# Patient Record
Sex: MALE | Race: WHITE | NOT HISPANIC OR LATINO | Employment: UNEMPLOYED | URBAN - NONMETROPOLITAN AREA
[De-identification: names, ages, dates, MRNs, and addresses within clinical notes are randomized per-mention and may not be internally consistent; named-entity substitution may affect disease eponyms.]

---

## 2023-01-12 ENCOUNTER — HOSPITAL ENCOUNTER (EMERGENCY)
Facility: HOSPITAL | Age: 48
Discharge: PSYCHIATRIC OR SUBSTANCE USE FACILITY | End: 2023-01-13
Attending: EMERGENCY MEDICINE | Admitting: EMERGENCY MEDICINE

## 2023-01-12 DIAGNOSIS — F19.10 SUBSTANCE ABUSE: Primary | ICD-10-CM

## 2023-01-12 LAB
ALBUMIN SERPL-MCNC: 3.9 G/DL (ref 3.5–5.2)
ALBUMIN/GLOB SERPL: 1.2 G/DL
ALP SERPL-CCNC: 90 U/L (ref 39–117)
ALT SERPL W P-5'-P-CCNC: 35 U/L (ref 1–41)
AMPHET+METHAMPHET UR QL: NEGATIVE
AMPHETAMINES UR QL: NEGATIVE
ANION GAP SERPL CALCULATED.3IONS-SCNC: 12.3 MMOL/L (ref 5–15)
AST SERPL-CCNC: 16 U/L (ref 1–40)
BARBITURATES UR QL SCN: NEGATIVE
BASOPHILS # BLD AUTO: 0.06 10*3/MM3 (ref 0–0.2)
BASOPHILS NFR BLD AUTO: 0.7 % (ref 0–1.5)
BENZODIAZ UR QL SCN: POSITIVE
BILIRUB SERPL-MCNC: 0.4 MG/DL (ref 0–1.2)
BUN SERPL-MCNC: 14 MG/DL (ref 6–20)
BUN/CREAT SERPL: 10.8 (ref 7–25)
BUPRENORPHINE SERPL-MCNC: NEGATIVE NG/ML
CALCIUM SPEC-SCNC: 9.4 MG/DL (ref 8.6–10.5)
CANNABINOIDS SERPL QL: POSITIVE
CHLORIDE SERPL-SCNC: 100 MMOL/L (ref 98–107)
CO2 SERPL-SCNC: 26.7 MMOL/L (ref 22–29)
COCAINE UR QL: NEGATIVE
CREAT SERPL-MCNC: 1.3 MG/DL (ref 0.76–1.27)
DEPRECATED RDW RBC AUTO: 41.8 FL (ref 37–54)
EGFRCR SERPLBLD CKD-EPI 2021: 69 ML/MIN/1.73
EOSINOPHIL # BLD AUTO: 0.29 10*3/MM3 (ref 0–0.4)
EOSINOPHIL NFR BLD AUTO: 3.3 % (ref 0.3–6.2)
ERYTHROCYTE [DISTWIDTH] IN BLOOD BY AUTOMATED COUNT: 12.9 % (ref 12.3–15.4)
ETHANOL BLD-MCNC: <10 MG/DL (ref 0–10)
ETHANOL UR QL: <0.01 %
FLUAV RNA RESP QL NAA+PROBE: NOT DETECTED
FLUBV RNA RESP QL NAA+PROBE: NOT DETECTED
GLOBULIN UR ELPH-MCNC: 3.2 GM/DL
GLUCOSE SERPL-MCNC: 98 MG/DL (ref 65–99)
HCT VFR BLD AUTO: 43.9 % (ref 37.5–51)
HGB BLD-MCNC: 14.6 G/DL (ref 13–17.7)
IMM GRANULOCYTES # BLD AUTO: 0.01 10*3/MM3 (ref 0–0.05)
IMM GRANULOCYTES NFR BLD AUTO: 0.1 % (ref 0–0.5)
LYMPHOCYTES # BLD AUTO: 3.09 10*3/MM3 (ref 0.7–3.1)
LYMPHOCYTES NFR BLD AUTO: 35.1 % (ref 19.6–45.3)
MAGNESIUM SERPL-MCNC: 1.8 MG/DL (ref 1.6–2.6)
MCH RBC QN AUTO: 29.5 PG (ref 26.6–33)
MCHC RBC AUTO-ENTMCNC: 33.3 G/DL (ref 31.5–35.7)
MCV RBC AUTO: 88.7 FL (ref 79–97)
METHADONE UR QL SCN: NEGATIVE
MONOCYTES # BLD AUTO: 1.04 10*3/MM3 (ref 0.1–0.9)
MONOCYTES NFR BLD AUTO: 11.8 % (ref 5–12)
NEUTROPHILS NFR BLD AUTO: 4.32 10*3/MM3 (ref 1.7–7)
NEUTROPHILS NFR BLD AUTO: 49 % (ref 42.7–76)
NRBC BLD AUTO-RTO: 0 /100 WBC (ref 0–0.2)
OPIATES UR QL: NEGATIVE
OXYCODONE UR QL SCN: POSITIVE
PCP UR QL SCN: NEGATIVE
PLATELET # BLD AUTO: 275 10*3/MM3 (ref 140–450)
PMV BLD AUTO: 8.7 FL (ref 6–12)
POTASSIUM SERPL-SCNC: 3.8 MMOL/L (ref 3.5–5.2)
PROPOXYPH UR QL: NEGATIVE
PROT SERPL-MCNC: 7.1 G/DL (ref 6–8.5)
RBC # BLD AUTO: 4.95 10*6/MM3 (ref 4.14–5.8)
SARS-COV-2 RNA RESP QL NAA+PROBE: NOT DETECTED
SODIUM SERPL-SCNC: 139 MMOL/L (ref 136–145)
TRICYCLICS UR QL SCN: NEGATIVE
WBC NRBC COR # BLD: 8.81 10*3/MM3 (ref 3.4–10.8)

## 2023-01-12 PROCEDURE — C9803 HOPD COVID-19 SPEC COLLECT: HCPCS

## 2023-01-12 PROCEDURE — 80306 DRUG TEST PRSMV INSTRMNT: CPT | Performed by: EMERGENCY MEDICINE

## 2023-01-12 PROCEDURE — 81001 URINALYSIS AUTO W/SCOPE: CPT | Performed by: EMERGENCY MEDICINE

## 2023-01-12 PROCEDURE — 99285 EMERGENCY DEPT VISIT HI MDM: CPT

## 2023-01-12 PROCEDURE — 82077 ASSAY SPEC XCP UR&BREATH IA: CPT | Performed by: EMERGENCY MEDICINE

## 2023-01-12 PROCEDURE — 85025 COMPLETE CBC W/AUTO DIFF WBC: CPT | Performed by: EMERGENCY MEDICINE

## 2023-01-12 PROCEDURE — 83735 ASSAY OF MAGNESIUM: CPT | Performed by: EMERGENCY MEDICINE

## 2023-01-12 PROCEDURE — 80053 COMPREHEN METABOLIC PANEL: CPT | Performed by: EMERGENCY MEDICINE

## 2023-01-12 PROCEDURE — 36415 COLL VENOUS BLD VENIPUNCTURE: CPT

## 2023-01-12 PROCEDURE — 87636 SARSCOV2 & INF A&B AMP PRB: CPT | Performed by: EMERGENCY MEDICINE

## 2023-01-13 ENCOUNTER — HOSPITAL ENCOUNTER (INPATIENT)
Facility: HOSPITAL | Age: 48
LOS: 3 days | Discharge: HOME | DRG: 897 | End: 2023-01-16
Attending: PSYCHIATRY & NEUROLOGY | Admitting: PSYCHIATRY & NEUROLOGY

## 2023-01-13 VITALS
DIASTOLIC BLOOD PRESSURE: 74 MMHG | HEIGHT: 70 IN | HEART RATE: 71 BPM | SYSTOLIC BLOOD PRESSURE: 111 MMHG | RESPIRATION RATE: 18 BRPM | OXYGEN SATURATION: 100 % | BODY MASS INDEX: 22.9 KG/M2 | TEMPERATURE: 97.5 F | WEIGHT: 160 LBS

## 2023-01-13 PROBLEM — F19.10 SUBSTANCE ABUSE: Status: ACTIVE | Noted: 2023-01-13

## 2023-01-13 PROBLEM — F11.20 OPIOID USE DISORDER, SEVERE, DEPENDENCE: Status: ACTIVE | Noted: 2023-01-13

## 2023-01-13 LAB
BACTERIA UR QL AUTO: ABNORMAL /HPF
BILIRUB UR QL STRIP: ABNORMAL
CLARITY UR: CLEAR
COLOR UR: ABNORMAL
GLUCOSE UR STRIP-MCNC: NEGATIVE MG/DL
HGB UR QL STRIP.AUTO: NEGATIVE
HYALINE CASTS UR QL AUTO: ABNORMAL /LPF
KETONES UR QL STRIP: ABNORMAL
LEUKOCYTE ESTERASE UR QL STRIP.AUTO: ABNORMAL
NITRITE UR QL STRIP: NEGATIVE
PH UR STRIP.AUTO: 5.5 [PH] (ref 5–8)
PROT UR QL STRIP: ABNORMAL
QT INTERVAL: 392 MS
QTC INTERVAL: 410 MS
RBC # UR STRIP: ABNORMAL /HPF
REF LAB TEST METHOD: ABNORMAL
SP GR UR STRIP: >1.03 (ref 1–1.03)
SQUAMOUS #/AREA URNS HPF: ABNORMAL /HPF
UROBILINOGEN UR QL STRIP: ABNORMAL
WBC # UR STRIP: ABNORMAL /HPF

## 2023-01-13 PROCEDURE — 93005 ELECTROCARDIOGRAM TRACING: CPT | Performed by: NURSE PRACTITIONER

## 2023-01-13 PROCEDURE — 63710000001 ONDANSETRON ODT 4 MG TABLET DISPERSIBLE: Performed by: NURSE PRACTITIONER

## 2023-01-13 PROCEDURE — 93010 ELECTROCARDIOGRAM REPORT: CPT | Performed by: INTERNAL MEDICINE

## 2023-01-13 PROCEDURE — HZ2ZZZZ DETOXIFICATION SERVICES FOR SUBSTANCE ABUSE TREATMENT: ICD-10-PCS | Performed by: PSYCHIATRY & NEUROLOGY

## 2023-01-13 PROCEDURE — 99223 1ST HOSP IP/OBS HIGH 75: CPT | Performed by: PSYCHIATRY & NEUROLOGY

## 2023-01-13 PROCEDURE — 63710000001 ONDANSETRON PER 8 MG: Performed by: PSYCHIATRY & NEUROLOGY

## 2023-01-13 RX ORDER — ACETAMINOPHEN 325 MG/1
650 TABLET ORAL EVERY 6 HOURS PRN
Status: DISCONTINUED | OUTPATIENT
Start: 2023-01-13 | End: 2023-01-16 | Stop reason: HOSPADM

## 2023-01-13 RX ORDER — OXYCODONE HCL 20 MG/1
20 TABLET, FILM COATED, EXTENDED RELEASE ORAL
Status: CANCELLED | OUTPATIENT
Start: 2023-01-13

## 2023-01-13 RX ORDER — BENZTROPINE MESYLATE 1 MG/1
2 TABLET ORAL ONCE AS NEEDED
Status: DISCONTINUED | OUTPATIENT
Start: 2023-01-13 | End: 2023-01-16 | Stop reason: HOSPADM

## 2023-01-13 RX ORDER — GABAPENTIN 400 MG/1
400 CAPSULE ORAL 3 TIMES DAILY
Status: CANCELLED | OUTPATIENT
Start: 2023-01-13

## 2023-01-13 RX ORDER — CYCLOBENZAPRINE HCL 10 MG
10 TABLET ORAL 3 TIMES DAILY PRN
Status: DISCONTINUED | OUTPATIENT
Start: 2023-01-13 | End: 2023-01-16 | Stop reason: HOSPADM

## 2023-01-13 RX ORDER — FAMOTIDINE 20 MG/1
20 TABLET, FILM COATED ORAL 2 TIMES DAILY PRN
Status: DISCONTINUED | OUTPATIENT
Start: 2023-01-13 | End: 2023-01-16 | Stop reason: HOSPADM

## 2023-01-13 RX ORDER — ECHINACEA PURPUREA EXTRACT 125 MG
2 TABLET ORAL AS NEEDED
Status: DISCONTINUED | OUTPATIENT
Start: 2023-01-13 | End: 2023-01-16 | Stop reason: HOSPADM

## 2023-01-13 RX ORDER — TRAZODONE HYDROCHLORIDE 50 MG/1
50 TABLET ORAL NIGHTLY PRN
Status: DISCONTINUED | OUTPATIENT
Start: 2023-01-13 | End: 2023-01-16 | Stop reason: HOSPADM

## 2023-01-13 RX ORDER — ONDANSETRON 4 MG/1
4 TABLET, FILM COATED ORAL EVERY 6 HOURS PRN
Status: DISCONTINUED | OUTPATIENT
Start: 2023-01-13 | End: 2023-01-16 | Stop reason: HOSPADM

## 2023-01-13 RX ORDER — HYDRALAZINE HYDROCHLORIDE 25 MG/1
25 TABLET, FILM COATED ORAL DAILY PRN
Status: DISCONTINUED | OUTPATIENT
Start: 2023-01-13 | End: 2023-01-16 | Stop reason: HOSPADM

## 2023-01-13 RX ORDER — TIZANIDINE 4 MG/1
4 TABLET ORAL EVERY 8 HOURS PRN
COMMUNITY

## 2023-01-13 RX ORDER — GABAPENTIN 300 MG/1
300 CAPSULE ORAL 3 TIMES DAILY
Status: DISCONTINUED | OUTPATIENT
Start: 2023-01-13 | End: 2023-01-16 | Stop reason: HOSPADM

## 2023-01-13 RX ORDER — OXYMORPHONE HYDROCHLORIDE 10 MG/1
10 TABLET, FILM COATED, EXTENDED RELEASE ORAL EVERY 12 HOURS
COMMUNITY
End: 2023-01-16 | Stop reason: HOSPADM

## 2023-01-13 RX ORDER — CLONIDINE HYDROCHLORIDE 0.1 MG/1
0.1 TABLET ORAL 2 TIMES DAILY PRN
Status: DISCONTINUED | OUTPATIENT
Start: 2023-01-16 | End: 2023-01-16 | Stop reason: HOSPADM

## 2023-01-13 RX ORDER — IBUPROFEN 400 MG/1
400 TABLET ORAL EVERY 6 HOURS PRN
Status: DISCONTINUED | OUTPATIENT
Start: 2023-01-13 | End: 2023-01-16 | Stop reason: HOSPADM

## 2023-01-13 RX ORDER — CLONIDINE HYDROCHLORIDE 0.1 MG/1
0.1 TABLET ORAL ONCE AS NEEDED
Status: DISCONTINUED | OUTPATIENT
Start: 2023-01-17 | End: 2023-01-16 | Stop reason: HOSPADM

## 2023-01-13 RX ORDER — HYDROXYZINE HYDROCHLORIDE 25 MG/1
50 TABLET, FILM COATED ORAL EVERY 6 HOURS PRN
Status: DISCONTINUED | OUTPATIENT
Start: 2023-01-13 | End: 2023-01-16 | Stop reason: HOSPADM

## 2023-01-13 RX ORDER — TIZANIDINE 4 MG/1
4 TABLET ORAL EVERY 8 HOURS PRN
Status: DISCONTINUED | OUTPATIENT
Start: 2023-01-13 | End: 2023-01-16 | Stop reason: HOSPADM

## 2023-01-13 RX ORDER — DICYCLOMINE HYDROCHLORIDE 10 MG/1
20 CAPSULE ORAL ONCE
Status: COMPLETED | OUTPATIENT
Start: 2023-01-13 | End: 2023-01-13

## 2023-01-13 RX ORDER — ALUMINA, MAGNESIA, AND SIMETHICONE 2400; 2400; 240 MG/30ML; MG/30ML; MG/30ML
15 SUSPENSION ORAL EVERY 6 HOURS PRN
Status: DISCONTINUED | OUTPATIENT
Start: 2023-01-13 | End: 2023-01-16 | Stop reason: HOSPADM

## 2023-01-13 RX ORDER — LISINOPRIL AND HYDROCHLOROTHIAZIDE 25; 20 MG/1; MG/1
1 TABLET ORAL DAILY
Status: ON HOLD | COMMUNITY
End: 2023-01-16 | Stop reason: SDUPTHER

## 2023-01-13 RX ORDER — ONDANSETRON 4 MG/1
4 TABLET, ORALLY DISINTEGRATING ORAL ONCE
Status: COMPLETED | OUTPATIENT
Start: 2023-01-13 | End: 2023-01-13

## 2023-01-13 RX ORDER — OXYCODONE HYDROCHLORIDE 10 MG/1
10 TABLET ORAL 3 TIMES DAILY
COMMUNITY
End: 2023-01-16 | Stop reason: HOSPADM

## 2023-01-13 RX ORDER — LISINOPRIL 10 MG/1
20 TABLET ORAL
Status: DISCONTINUED | OUTPATIENT
Start: 2023-01-13 | End: 2023-01-16 | Stop reason: HOSPADM

## 2023-01-13 RX ORDER — LOPERAMIDE HYDROCHLORIDE 2 MG/1
2 CAPSULE ORAL
Status: DISCONTINUED | OUTPATIENT
Start: 2023-01-13 | End: 2023-01-16 | Stop reason: HOSPADM

## 2023-01-13 RX ORDER — BENZTROPINE MESYLATE 1 MG/ML
1 INJECTION INTRAMUSCULAR; INTRAVENOUS ONCE AS NEEDED
Status: DISCONTINUED | OUTPATIENT
Start: 2023-01-13 | End: 2023-01-16 | Stop reason: HOSPADM

## 2023-01-13 RX ORDER — DICYCLOMINE HYDROCHLORIDE 10 MG/1
10 CAPSULE ORAL 3 TIMES DAILY PRN
Status: DISCONTINUED | OUTPATIENT
Start: 2023-01-13 | End: 2023-01-16 | Stop reason: HOSPADM

## 2023-01-13 RX ORDER — OXYCODONE HYDROCHLORIDE 5 MG/1
10 TABLET ORAL 3 TIMES DAILY
Status: CANCELLED | OUTPATIENT
Start: 2023-01-13

## 2023-01-13 RX ORDER — BENZONATATE 100 MG/1
100 CAPSULE ORAL 3 TIMES DAILY PRN
Status: DISCONTINUED | OUTPATIENT
Start: 2023-01-13 | End: 2023-01-16 | Stop reason: HOSPADM

## 2023-01-13 RX ORDER — CLONIDINE HYDROCHLORIDE 0.1 MG/1
0.1 TABLET ORAL 3 TIMES DAILY PRN
Status: DISPENSED | OUTPATIENT
Start: 2023-01-15 | End: 2023-01-16

## 2023-01-13 RX ORDER — CLONIDINE HYDROCHLORIDE 0.1 MG/1
0.1 TABLET ORAL 4 TIMES DAILY PRN
Status: DISPENSED | OUTPATIENT
Start: 2023-01-14 | End: 2023-01-15

## 2023-01-13 RX ORDER — GABAPENTIN 400 MG/1
400 CAPSULE ORAL 3 TIMES DAILY
COMMUNITY
End: 2023-01-16 | Stop reason: HOSPADM

## 2023-01-13 RX ORDER — HYDROCHLOROTHIAZIDE 25 MG/1
25 TABLET ORAL
Status: DISCONTINUED | OUTPATIENT
Start: 2023-01-13 | End: 2023-01-16 | Stop reason: HOSPADM

## 2023-01-13 RX ORDER — CLONIDINE HYDROCHLORIDE 0.1 MG/1
0.1 TABLET ORAL 4 TIMES DAILY PRN
Status: DISPENSED | OUTPATIENT
Start: 2023-01-13 | End: 2023-01-14

## 2023-01-13 RX ADMIN — DICYCLOMINE HYDROCHLORIDE 10 MG: 10 CAPSULE ORAL at 14:19

## 2023-01-13 RX ADMIN — GABAPENTIN 300 MG: 300 CAPSULE ORAL at 15:21

## 2023-01-13 RX ADMIN — IBUPROFEN 400 MG: 400 TABLET, FILM COATED ORAL at 02:15

## 2023-01-13 RX ADMIN — LISINOPRIL 20 MG: 10 TABLET ORAL at 08:37

## 2023-01-13 RX ADMIN — GABAPENTIN 300 MG: 300 CAPSULE ORAL at 08:38

## 2023-01-13 RX ADMIN — DICYCLOMINE HYDROCHLORIDE 10 MG: 10 CAPSULE ORAL at 21:14

## 2023-01-13 RX ADMIN — HYDROCHLOROTHIAZIDE 25 MG: 25 TABLET ORAL at 08:37

## 2023-01-13 RX ADMIN — IBUPROFEN 400 MG: 400 TABLET, FILM COATED ORAL at 08:39

## 2023-01-13 RX ADMIN — CLONIDINE HYDROCHLORIDE 0.1 MG: 0.1 TABLET ORAL at 08:37

## 2023-01-13 RX ADMIN — HYDROXYZINE HYDROCHLORIDE 50 MG: 25 TABLET ORAL at 21:14

## 2023-01-13 RX ADMIN — DICYCLOMINE HYDROCHLORIDE 20 MG: 10 CAPSULE ORAL at 00:13

## 2023-01-13 RX ADMIN — ONDANSETRON HYDROCHLORIDE 4 MG: 4 TABLET, FILM COATED ORAL at 14:19

## 2023-01-13 RX ADMIN — ACETAMINOPHEN 650 MG: 325 TABLET ORAL at 05:56

## 2023-01-13 RX ADMIN — CLONIDINE HYDROCHLORIDE 0.1 MG: 0.1 TABLET ORAL at 21:14

## 2023-01-13 RX ADMIN — HYDROXYZINE HYDROCHLORIDE 50 MG: 25 TABLET ORAL at 14:20

## 2023-01-13 RX ADMIN — IBUPROFEN 400 MG: 400 TABLET, FILM COATED ORAL at 14:19

## 2023-01-13 RX ADMIN — CYCLOBENZAPRINE 10 MG: 10 TABLET, FILM COATED ORAL at 02:16

## 2023-01-13 RX ADMIN — HYDROXYZINE HYDROCHLORIDE 50 MG: 25 TABLET ORAL at 02:15

## 2023-01-13 RX ADMIN — TRAZODONE HYDROCHLORIDE 50 MG: 50 TABLET ORAL at 21:14

## 2023-01-13 RX ADMIN — TRAZODONE HYDROCHLORIDE 50 MG: 50 TABLET ORAL at 02:15

## 2023-01-13 RX ADMIN — IBUPROFEN 400 MG: 400 TABLET, FILM COATED ORAL at 21:15

## 2023-01-13 RX ADMIN — GABAPENTIN 300 MG: 300 CAPSULE ORAL at 21:14

## 2023-01-13 RX ADMIN — CLONIDINE HYDROCHLORIDE 0.1 MG: 0.1 TABLET ORAL at 14:19

## 2023-01-13 RX ADMIN — CYCLOBENZAPRINE 10 MG: 10 TABLET, FILM COATED ORAL at 14:20

## 2023-01-13 RX ADMIN — ONDANSETRON 4 MG: 4 TABLET, ORALLY DISINTEGRATING ORAL at 00:13

## 2023-01-13 NOTE — NURSING NOTE
"46 y/o presents to intake with request for help to detox from \"prescribed Oxymorphone & Oxycodone\". Pt advised he takes Oxycodone 10mg tid with last dose 1-11-23, Oxymorphone 10mg bid with last dose 1-11-23. Pt advised he started taking prescribed meds around 2009 for neck injury/pain. Advised he took a hit off THC a couple of days ago, but does not use regularly. Advised he took a xanax from old prescription yesterday to help ease withdrawal symptoms. Pt reported he will be going to a rehab facility in [facility] following detox.     Denies SI/HI/AVH.    Depression 7/10.  Anxiety 6/10.    COWS 4  CIWA 0    Denies alcohol use, or illicit drug use. States he did take some leftover Xanax yesterday in attempts to help symptoms.     Creatine 1.30    U/A -  Dark Yellow  Specific Gravity greater than 1.030  Ketones 15  Leukocytes trace  Protein 100  Bilirubin small  WBC 3-5  Bacteria trace    UDS  Benzo  Oxycodone  THC    Medicated with Bentyl and Zofran in intake for stomach cramps.   "
"Pt awaiting EKG for admission - spoke with tech \"order received, busy with other patients at present time.\" Explained delay to pt.  "
Awaiting registration for armband.  
Cleared for admit per SKYE Aguilar.  
Contacted lead, Qing, for bed assignment - 42b.  
EKG being done.  
Pt to intake. Search completed with 2 staff members present. Pt educated about mask and encouraged to keep mask on in intake area. Items placed in cabinet.  
Received to intake with ER tech.  
Spoke to Dr.B. Cabrera via phone. Intake information provided. Instructed to admit the patient. Admit orders received. Clonidine detox orders RBTOx2. Patient and ED provide, SKYE Aguilar made aware of plan of care. Safety precautions maintained.  
Urine collected.  Pt request something for stomach cramps, anxiety, chronic pain left arm, shoulder, neck, left hip, leg. Notified SKYE Aguilar.  
Waiting for admission EKG per AMERICA Fitzgerald, APRN request.  
No

## 2023-01-13 NOTE — H&P
INITIAL PSYCHIATRIC HISTORY & PHYSICAL    Patient Identification:  Name:  Indra Rodriguez  Age:  47 y.o.  Sex:  male  :  1975  MRN:  [id number]   Visit Number:  [id number]  Primary Care Physician:  Provider, No Known    SUBJECTIVE    CC/Focus of Exam: Opioid use     HPI: Indra Rodriguez is a 47 y.o. male who was admitted on 2023 with complaints of opioid use and withdrawals. The patient reports a long history of substance use. First use was  when he was prescribed opioids for neck injury and pain. Over time the use increased and the patient  continued to use despite negative consequences and constantly needs more pain medication. The patient endorses symptoms of tolerance and withdrawals. Has tried to cut down and stop but has not been successful. Spends too much time and resources in pursuit of substance use. Longest period of sobriety is reported to be none as he has been on continuous prescription.  Currently using Oxycodone 10mg tid with last dose 23, Oxymorphone 10mg bid with last dose 23.  The patient reports he took a hit off THC a couple of days ago but denies regular use. Also took an old prescription Xanax day before yesterday to help with withdrawal symptoms.  Withdrawal symptoms include cramps, feeling shaky, diarrhea, feeling weak    PAST PSYCHIATRIC HX: The patient reports a history of anxiety and was prescribed Xanax for it for 9 years and he stopped taking it in 2019.     SUBSTANCE USE HX: See HPI for opioid use.     SOCIAL HX:   Social History     Socioeconomic History   • Marital status:      Spouse name: Jana Rodriguez   • Number of children: 3   • Years of education: college   • Highest education level: Associate degree: academic program   Tobacco Use   • Smoking status: Never   Vaping Use   • Vaping Use: Never used   Substance and Sexual Activity   • Alcohol use: Never   • Drug use: Yes     Types: Benzodiazepines, Hydromorphone, Oxycodone, Marijuana   •  Sexual activity: Yes     Partners: Female         Past Medical History:   Diagnosis Date   • Anxiety    • Asthma    • Depression    • HTN (hypertension)    • Kidney stones    • Neck injuries    • YENI (obstructive sleep apnea)    • Substance abuse (HCC)           Past Surgical History:   Procedure Laterality Date   • CERVICAL SPINE SURGERY     • CHOLECYSTECTOMY     • CYSTOSCOPY W/ LASER LITHOTRIPSY     • KNEE ARTHROSCOPY Right        Family History   Problem Relation Age of Onset   • Anxiety disorder Mother          Medications Prior to Admission   Medication Sig Dispense Refill Last Dose   • gabapentin (NEURONTIN) 400 MG capsule Take 400 mg by mouth 3 (Three) Times a Day.      • lisinopril-hydrochlorothiazide (PRINZIDE,ZESTORETIC) 20-25 MG per tablet Take 1 tablet by mouth Daily.      • oxyCODONE (ROXICODONE) 10 MG tablet Take 10 mg by mouth 3 (Three) Times a Day.      • oxyMORphone ER (OPANA ER) 10 MG 12 hr tablet Take 10 mg by mouth Every 12 (Twelve) Hours.      • tiZANidine (ZANAFLEX) 4 MG tablet Take 4 mg by mouth Every 8 (Eight) Hours As Needed for Muscle Spasms.            ALLERGIES:  Patient has no known allergies.    Temp:  [96.7 °F (35.9 °C)-97.9 °F (36.6 °C)] 96.7 °F (35.9 °C)  Heart Rate:  [] 86  Resp:  [18-19] 18  BP: ()/() 148/74    REVIEW OF SYSTEMS:  Review of Systems   Constitutional: Positive for chills, diaphoresis and fatigue.   HENT: Negative.    Eyes: Negative.    Respiratory: Negative.    Cardiovascular: Negative.    Gastrointestinal: Positive for abdominal pain, diarrhea and nausea.   Endocrine: Negative.    Genitourinary: Negative.    Musculoskeletal: Positive for back pain and neck pain.   Skin: Negative.    Allergic/Immunologic: Negative.    Neurological: Positive for weakness.   Hematological: Negative.    Psychiatric/Behavioral: Positive for dysphoric mood. The patient is nervous/anxious.         OBJECTIVE    PHYSICAL EXAM:  Physical Exam  Constitutional:  Appears  well-developed and well-nourished.   HENT:   Head: Normocephalic and atraumatic.   Right Ear: External ear normal.   Left Ear: External ear normal.   Mouth/Throat: Oropharynx is clear and moist.   Eyes: Pupils are equal, round, and reactive to light. Conjunctivae and EOM are normal.   Neck: Normal range of motion. Neck supple.   Cardiovascular: Normal rate, regular rhythm and normal heart sounds.    Respiratory: Effort normal and breath sounds normal. No respiratory distress. No wheezes.   GI: Soft. Bowel sounds are normal.No distension. There is no tenderness.   Musculoskeletal: Normal range of motion. No edema or deformity.   Neurological:No cranial nerve deficit. Coordination normal.   Skin: Skin is warm and dry. No rash noted. No erythema.       MENTAL STATUS EXAM:   Hygiene:   fair  Cooperation:  Cooperative  Eye Contact:  Fair  Psychomotor Behavior:  Appropriate  Affect:  Restricted  Hopelessness: Denies  Speech:  Normal  Goal directed  Thought Content:  Normal  Suicidal:  None  Homicidal:  None  Hallucinations:  None  Delusion:  None  Memory:  Intact  Orientation:  Person, Place, Time and Situation  Reliability:  fair  Insight:  Fair  Judgement:  Fair  Impulse Control:  Fair      Imaging Results (Last 24 Hours)     ** No results found for the last 24 hours. **           ECG/EMG Results (most recent)     None           Lab Results   Component Value Date    GLUCOSE 98 01/12/2023    BUN 14 01/12/2023    CREATININE 1.30 (H) 01/12/2023    BCR 10.8 01/12/2023    CO2 26.7 01/12/2023    CALCIUM 9.4 01/12/2023    ALBUMIN 3.9 01/12/2023    AST 16 01/12/2023    ALT 35 01/12/2023       Lab Results   Component Value Date    WBC 8.81 01/12/2023    HGB 14.6 01/12/2023    HCT 43.9 01/12/2023    MCV 88.7 01/12/2023     01/12/2023       Last Urine Toxicity     LAST URINE TOXICITY RESULTS Latest Ref Rng & Units 1/12/2023    AMPHETAMINES SCREEN, URINE Negative Negative    BARBITURATES SCREEN Negative Negative     BENZODIAZEPINE SCREEN, URINE Negative Positive(A)    BUPRENORPHINEUR Negative Negative    COCAINE SCREEN, URINE Negative Negative    METHADONE SCREEN, URINE Negative Negative    METHAMPHETAMINEUR Negative Negative          Brief Urine Lab Results  (Last result in the past 365 days)      Color   Clarity   Blood   Leuk Est   Nitrite   Protein   CREAT   Urine HCG        01/12/23 2334 Dark Yellow   Clear   Negative   Trace   Negative   100 mg/dL (2+)                 DATA  Labs reviewed. Creatinine 1.3. UA shows dark yellow color, ketones, trace leukocyte esterase, protein, bilirubin, 3-5 WBC, trace bacteria. UDS positive for benzodiazepine, oxycodone, THC.   EKG reviewed. QTc 410 ms. JACKSON reviewed.   Record reviewed. No previous treatment noted in this hospital for mental health or substance use problems.       Strengths: Motivated for treatment    Weaknesses:Substance use and Poor coping skills    Code status:  Full  Discussed code status with patient.    ASSESSMENT & PLAN:        Opioid use disorder, severe, dependence (HCC)  -Clonidine detox  -Symptom control medication      HTN (hypertension)  -Lisinopril-HCTZ      Chronic pain  -Continue gabapentin      Elevated Creatinine  -Repeat in am  -Encourage po intake  -May have to hold Lisinopril-HCTZ      Anxiety disorder unspecified  -Start duloxetine 30 mg daily    The patient has been admitted for safety and stabilization.  Patient will be monitored for suicidality daily and maintained on Special Precautions Level 4 (q30 min checks).  The patient will have individual and group therapy with a master's level therapist. A master treatment plan will be developed and agreed upon by the patient and his/her treatment team.  The patient's estimated length of stay in the hospital is 5-7 days.

## 2023-01-13 NOTE — PLAN OF CARE
Goal Outcome Evaluation:              Problem: Adult Behavioral Health Plan of Care  Goal: Patient-Specific Goal (Individualization)  Outcome: Ongoing, Progressing  Flowsheets  Taken 1/13/2023 1055  Patient-Specific Goals (Include Timeframe): Identify 2-3 coping skills, address relapse prevention methods, complete aftercare plans, and deny SI/HI prior to discharge.  Individualized Care Needs: Therapist to offer 1-4 therapy sessions, aftercare planning, safety planning, family education, group therapy, and brief CBT/MI interventions.  Anxieties, Fears or Concerns: patient reports being uncomfortable and scared due to withdrawals  Taken 1/13/2023 1049  Patient Personal Strengths:   resilient   resourceful  Patient Vulnerabilities:   substance abuse/addiction   poor impulse control  Goal: Optimized Coping Skills in Response to Life Stressors  Outcome: Ongoing, Progressing  Flowsheets (Taken 1/13/2023 1055)  Optimized Coping Skills in Response to Life Stressors: making progress toward outcome  Intervention: Promote Effective Coping Strategies  Flowsheets (Taken 1/13/2023 1055)  Supportive Measures:   active listening utilized   counseling provided   decision-making supported   verbalization of feelings encouraged   goal-setting facilitated  Goal: Develops/Participates in Therapeutic [place] to Support Successful Transition  Outcome: Ongoing, Progressing  Flowsheets (Taken 1/13/2023 1055)  Develops/Participates in Therapeutic [place] to Support Successful Transition: making progress toward outcome  Intervention: Foster Therapeutic [place]  Flowsheets (Taken 1/13/2023 1055)  Trust Relationship/Rapport:   care explained   reassurance provided   choices provided   thoughts/feelings acknowledged   emotional support provided   empathic listening provided   questions answered   questions encouraged  Intervention: Mutually Develop Transition Plan  Flowsheets  Taken 1/13/2023 1055  Outpatient/Agency/Support Group Needs:  residential services  Transition Support:   follow-up care discussed   follow-up care coordinated   crisis management plan verbalized   crisis management plan promoted   community resources reviewed  Anticipated Discharge Disposition: residential substance use unit  Taken 1/13/2023 1053  Discharge Coordination/Progress: Therapist attempted to meet with patient to complete assessment. Patient gave verbal consent for [facility] for [facility] inpatient rehab placement. Agency to provide transportation, pending acceptance.  Transportation Anticipated: agency  Transportation Concerns: none  Current Discharge Risk:   substance use/abuse   psychiatric illness  Concerns to be Addressed:   coping/stress   mental health   substance/tobacco abuse/use   medication  Readmission Within the Last 30 Days: no previous admission in last 30 days  Patient/Family Anticipated Services at Transition: rehabilitation services  Patient's Choice of Community Agency(s): [facility]  Patient/Family Anticipates Transition to: inpatient rehabilitation facility  Offered/Gave Vendor List: no      DATA:         Therapist met individually with patient this date to introduce role and to discuss hospitalization expectations. Patient agreeable.     Verbal consent for [facility]     Clinical Maneuvering/Intervention:     Therapist assisted patient in processing above session content; acknowledged and normalized patient’s thoughts, feelings, and concerns.  Discussed the therapist/patient relationship and explain the parameters and limitations of relative confidentiality.  Also discussed the importance of active participation, and honesty to the treatment process.  Encouraged the patient to discuss/vent their feelings, frustrations, and fears concerning their ongoing medical issues and validated their feelings.     Discussed the importance of finding enjoyable activities and coping skills that the patient can engage in a regular basis. Discussed  healthy coping skills such as distraction, self love, grounding, thought challenges/reframing, etc.  Provided patient with list of healthy coping skills this date. Discussed the importance of medication compliance.  Praised the patient for seeking help and spent the majority of the session building rapport.       Allowed patient to freely discuss issues without interruption or judgment. Provided safe, confidential environment to facilitate the development of positive therapeutic relationship and encourage open, honest communication.      Therapist addressed discharge safety planning this date. Assisted patient in identifying risk factors which would indicate the need for higher level of care after discharge;  including thoughts to harm self or others and/or self-harming behavior. Encouraged patient to call 911, or present to the nearest emergency room should any of these events occur. Discussed crisis intervention services and means to access.  Encouraged securing any objects of harm.       Therapist completed integrated summary, treatment plan, and initiated social history this date.  Therapist is strongly encouraging family involvement in treatment.       ASSESSMENT:      The patient is a 46 y/o  male admitted for opiate detox treatment. Therapist attempted to meet with patient to complete assessment. Patient was snoring loudly but would still respond to questions at times, abrupt and exaggerated movements. Patient gave verbal consent for [facility] in [place] for [facility] inpatient rehab placement. Patient has no history of past psych/detox admissions. Patient has been using opiates since 2009 after a work related injury and has most recently been involved with [facility] in [place], [place]. Rehab agency to provide transportation at discharge pending acceptance of patient.      PLAN:       Patient to remain hospitalized this date.     Treatment team will focus efforts on stabilizing  patient's acute symptoms while providing education on healthy coping and crisis management to reduce hospitalizations.   Patient requires daily psychiatrist evaluation and 24/7 nursing supervision to promote patient  safety.     Therapist will offer 1-4 individual sessions, 1 therapy group daily, family education, and appropriate referral.    Therapist recommends [facility] inpatient rehab. Patient gave verbal consent for [facility].

## 2023-01-13 NOTE — PROGRESS NOTES
1600: Spoke with [facility]. They will accept patient. Treatment team to call Monday and further coordinate.     1405: Transferred call so patient could complete phone screening.     1201: Navigator is helping Primary Therapist with discharge planning for patient. Navigator contacted [facility] and spoke with Danny. Patient will need to call today after 2pm to complete a phone screening.     [facility] - [phone]

## 2023-01-13 NOTE — PLAN OF CARE
Problem: Adult Behavioral Health Plan of Care  Goal: Plan of Care Review  Outcome: Ongoing, Progressing  Flowsheets  Taken 1/13/2023 1435  Progress: improving  Plan of Care Reviewed With: patient  Patient Agreement with Plan of Care: agrees  Taken 1/13/2023 0836  Plan of Care Reviewed With: patient  Patient Agreement with Plan of Care: agrees   Goal Outcome Evaluation:  Plan of Care Reviewed With: patient  Patient Agreement with Plan of Care: agrees     Progress: improving

## 2023-01-13 NOTE — PLAN OF CARE
Goal Outcome Evaluation:  Plan of Care Reviewed With: patient  Patient Agreement with Plan of Care: agrees         Pt new admit for this shift. Given Flexerail, Motrin, and Atarax prn medications.

## 2023-01-14 LAB
ANION GAP SERPL CALCULATED.3IONS-SCNC: 6.3 MMOL/L (ref 5–15)
BUN SERPL-MCNC: 14 MG/DL (ref 6–20)
BUN/CREAT SERPL: 16.3 (ref 7–25)
CALCIUM SPEC-SCNC: 9.4 MG/DL (ref 8.6–10.5)
CHLORIDE SERPL-SCNC: 103 MMOL/L (ref 98–107)
CO2 SERPL-SCNC: 29.7 MMOL/L (ref 22–29)
CREAT SERPL-MCNC: 0.86 MG/DL (ref 0.76–1.27)
EGFRCR SERPLBLD CKD-EPI 2021: 108.8 ML/MIN/1.73
GLUCOSE SERPL-MCNC: 136 MG/DL (ref 65–99)
POTASSIUM SERPL-SCNC: 4.1 MMOL/L (ref 3.5–5.2)
SODIUM SERPL-SCNC: 139 MMOL/L (ref 136–145)

## 2023-01-14 PROCEDURE — 80048 BASIC METABOLIC PNL TOTAL CA: CPT | Performed by: PSYCHIATRY & NEUROLOGY

## 2023-01-14 PROCEDURE — 99232 SBSQ HOSP IP/OBS MODERATE 35: CPT | Performed by: PSYCHIATRY & NEUROLOGY

## 2023-01-14 RX ADMIN — IBUPROFEN 400 MG: 400 TABLET, FILM COATED ORAL at 14:07

## 2023-01-14 RX ADMIN — GABAPENTIN 300 MG: 300 CAPSULE ORAL at 08:21

## 2023-01-14 RX ADMIN — TRAZODONE HYDROCHLORIDE 50 MG: 50 TABLET ORAL at 21:08

## 2023-01-14 RX ADMIN — HYDROXYZINE HYDROCHLORIDE 50 MG: 25 TABLET ORAL at 14:06

## 2023-01-14 RX ADMIN — IBUPROFEN 400 MG: 400 TABLET, FILM COATED ORAL at 08:21

## 2023-01-14 RX ADMIN — HYDROXYZINE HYDROCHLORIDE 50 MG: 25 TABLET ORAL at 08:21

## 2023-01-14 RX ADMIN — HYDROXYZINE HYDROCHLORIDE 50 MG: 25 TABLET ORAL at 21:08

## 2023-01-14 RX ADMIN — CLONIDINE HYDROCHLORIDE 0.1 MG: 0.1 TABLET ORAL at 14:06

## 2023-01-14 RX ADMIN — GABAPENTIN 300 MG: 300 CAPSULE ORAL at 21:08

## 2023-01-14 RX ADMIN — HYDROCHLOROTHIAZIDE 25 MG: 25 TABLET ORAL at 08:21

## 2023-01-14 RX ADMIN — LISINOPRIL 20 MG: 10 TABLET ORAL at 08:21

## 2023-01-14 RX ADMIN — IBUPROFEN 400 MG: 400 TABLET, FILM COATED ORAL at 21:08

## 2023-01-14 RX ADMIN — CYCLOBENZAPRINE 10 MG: 10 TABLET, FILM COATED ORAL at 21:08

## 2023-01-14 RX ADMIN — GABAPENTIN 300 MG: 300 CAPSULE ORAL at 15:21

## 2023-01-14 RX ADMIN — CYCLOBENZAPRINE 10 MG: 10 TABLET, FILM COATED ORAL at 08:21

## 2023-01-14 NOTE — PLAN OF CARE
Goal Outcome Evaluation:  Plan of Care Reviewed With: patient  Patient Agreement with Plan of Care: agrees     Progress: no change  Outcome Evaluation: Patient new admission from yesterday. Stated that his appetite is fair. Reported sleeping good. Rated anxiety and depression a 7. Rated his cravings at 3 with withdrawal symptoms. Patient is calm/cooperative. He stayed in his room for most of the shift. Encouraged him to come into day room to socialize with the other patients. Will observe for changes and will folllow up as needed.

## 2023-01-14 NOTE — PLAN OF CARE
Goal Outcome Evaluation:  Plan of Care Reviewed With: patient  Patient Agreement with Plan of Care: agrees     Progress: improving       Pt slept well, appetite is good, and participated in group. Pt given Bentyl, Atarax, Motrin and Trazodone prn medications.

## 2023-01-15 PROCEDURE — 99232 SBSQ HOSP IP/OBS MODERATE 35: CPT | Performed by: PSYCHIATRY & NEUROLOGY

## 2023-01-15 RX ADMIN — GABAPENTIN 300 MG: 300 CAPSULE ORAL at 08:02

## 2023-01-15 RX ADMIN — IBUPROFEN 400 MG: 400 TABLET, FILM COATED ORAL at 21:07

## 2023-01-15 RX ADMIN — HYDROCHLOROTHIAZIDE 25 MG: 25 TABLET ORAL at 08:00

## 2023-01-15 RX ADMIN — CYCLOBENZAPRINE 10 MG: 10 TABLET, FILM COATED ORAL at 08:22

## 2023-01-15 RX ADMIN — CYCLOBENZAPRINE 10 MG: 10 TABLET, FILM COATED ORAL at 21:07

## 2023-01-15 RX ADMIN — GABAPENTIN 300 MG: 300 CAPSULE ORAL at 21:07

## 2023-01-15 RX ADMIN — LOPERAMIDE HYDROCHLORIDE 2 MG: 2 CAPSULE ORAL at 08:32

## 2023-01-15 RX ADMIN — DICYCLOMINE HYDROCHLORIDE 10 MG: 10 CAPSULE ORAL at 19:57

## 2023-01-15 RX ADMIN — LOPERAMIDE HYDROCHLORIDE 2 MG: 2 CAPSULE ORAL at 15:35

## 2023-01-15 RX ADMIN — HYDROXYZINE HYDROCHLORIDE 50 MG: 25 TABLET ORAL at 21:07

## 2023-01-15 RX ADMIN — ACETAMINOPHEN 650 MG: 325 TABLET ORAL at 19:57

## 2023-01-15 RX ADMIN — GABAPENTIN 300 MG: 300 CAPSULE ORAL at 15:35

## 2023-01-15 RX ADMIN — LISINOPRIL 20 MG: 10 TABLET ORAL at 08:00

## 2023-01-15 RX ADMIN — DICYCLOMINE HYDROCHLORIDE 10 MG: 10 CAPSULE ORAL at 08:32

## 2023-01-15 RX ADMIN — HYDROXYZINE HYDROCHLORIDE 50 MG: 25 TABLET ORAL at 15:38

## 2023-01-15 RX ADMIN — TRAZODONE HYDROCHLORIDE 50 MG: 50 TABLET ORAL at 21:07

## 2023-01-15 RX ADMIN — IBUPROFEN 400 MG: 400 TABLET, FILM COATED ORAL at 06:32

## 2023-01-15 RX ADMIN — IBUPROFEN 400 MG: 400 TABLET, FILM COATED ORAL at 15:35

## 2023-01-15 RX ADMIN — CLONIDINE HYDROCHLORIDE 0.1 MG: 0.1 TABLET ORAL at 21:07

## 2023-01-15 RX ADMIN — HYDROXYZINE HYDROCHLORIDE 50 MG: 25 TABLET ORAL at 08:23

## 2023-01-15 NOTE — PLAN OF CARE
Goal Outcome Evaluation:  Plan of Care Reviewed With: patient  Patient Agreement with Plan of Care: agrees     Progress: improving  Outcome Evaluation: Pt rates anxiety and depression 7/10, pt rates craving 5/10 and denies SI/HI/AVH. Pt continues to have somatic concerns but spent more time in the day room interacting with his peers. Pt appeared to sleep throughout the night.

## 2023-01-15 NOTE — PLAN OF CARE
Goal Outcome Evaluation:  Plan of Care Reviewed With: patient  Patient Agreement with Plan of Care: agrees     Progress: improving  Outcome Evaluation: Patient reports cravings have decreased. Reports withdrawal sx as abdominal discomfort, headache and diarrhea. Inreracting well with others. No other signs distress noted.

## 2023-01-15 NOTE — PROGRESS NOTES
"INPATIENT PSYCHIATRIC PROGRESS NOTE    Name:  Indra Rodriguez  :  1975  MRN:  [id number]  Visit Number:  [id number]  Length of stay:  1    SUBJECTIVE    CC/Focus of Exam: opioid use    INTERVAL HISTORY:  The patient reports he is experiencing more pain today. States his last use was two days ago when he took oxymorphone and oxycodone.  Depression rating 10  Anxiety rating 7/10  Sleep:fair  Withdrawal sx: body aches, upset stomach, weakness, tremors, foggy headed  Cravin/10    Review of Systems   Constitutional: Positive for diaphoresis and fatigue.   Musculoskeletal: Positive for arthralgias, back pain and myalgias.   Neurological: Positive for tremors and weakness.   Psychiatric/Behavioral: Positive for dysphoric mood. The patient is nervous/anxious.        OBJECTIVE    Temp:  [96.8 °F (36 °C)-97.9 °F (36.6 °C)] 96.8 °F (36 °C)  Heart Rate:  [] 96  Resp:  [16-18] 18  BP: (118-144)/(72-86) 118/72    MENTAL STATUS EXAM:  Appearance:Casually dressed, good hygeine.   Cooperation:Cooperative  Psychomotor: No psychomotor agitation/retardation, No EPS, No motor tics  Speech-normal rate, amount.  Mood \"anxious\"   Affect-congruent, appropriate, stable  Thought Content-goal directed, no delusional material present  Thought process-linear, organized.  Suicidality: No SI  Homicidality: No HI  Perception: No AH/VH  Insight-fair   Judgement-fair    Lab Results (last 24 hours)     Procedure Component Value Units Date/Time    Basic Metabolic Panel [[id number]]  (Abnormal) Collected: 23    Specimen: Blood Updated: 23     Glucose 136 mg/dL      BUN 14 mg/dL      Creatinine 0.86 mg/dL      Sodium 139 mmol/L      Potassium 4.1 mmol/L      Comment: Slight hemolysis detected by analyzer. Results may be affected.        Chloride 103 mmol/L      CO2 29.7 mmol/L      Calcium 9.4 mg/dL      BUN/Creatinine Ratio 16.3     Anion Gap 6.3 mmol/L      eGFR 108.8 mL/min/1.73      Comment: National Kidney " Foundation and American Society of Nephrology (ASN) Task Force recommended calculation based on the Chronic Kidney Disease Epidemiology Collaboration (CKD-EPI) equation refit without adjustment for race.       Narrative:      GFR Normal >60  Chronic Kidney Disease <60  Kidney Failure <15               Imaging Results (Last 24 Hours)     ** No results found for the last 24 hours. **             ECG/EMG Results (most recent)     None           ALLERGIES: Patient has no known allergies.    Medication Review:   Scheduled Medications:  gabapentin, 300 mg, Oral, TID  lisinopril, 20 mg, Oral, Q24H   And  hydroCHLOROthiazide, 25 mg, Oral, Q24H         PRN Medications:  •  acetaminophen  •  aluminum-magnesium hydroxide-simethicone  •  benzonatate  •  benztropine **OR** benztropine  •  cloNIDine **FOLLOWED BY** [START ON 1/15/2023] cloNIDine **FOLLOWED BY** [START ON 1/16/2023] cloNIDine **FOLLOWED BY** [START ON 1/17/2023] cloNIDine  •  cyclobenzaprine  •  dicyclomine  •  famotidine  •  hydrALAZINE  •  hydrOXYzine  •  ibuprofen  •  loperamide  •  magnesium hydroxide  •  ondansetron  •  sodium chloride  •  tiZANidine  •  traZODone   All medications reviewed.    ASSESSMENT & PLAN:      Opioid use disorder, severe, dependence (HCC)  -Clonidine detox  -Symptom control medication       HTN (hypertension)  -Lisinopril-HCTZ       Chronic pain  -Continue gabapentin       Elevated Creatinine  -Repeat in am  -Encourage po intake  -May have to hold Lisinopril-HCTZ       Anxiety disorder unspecified  -Continue duloxetine 30 mg daily    Special precautions: Special Precautions Level 4 (q30 min checks).    Behavioral Health Treatment Plan and Problem List: I have reviewed and approved the Behavioral Health Treatment Plan and Problem list.  The patient has had a chance to review and agrees with the treatment plan.     Clinician:  Katherin Sorto MD  01/14/23  19:21 EST

## 2023-01-15 NOTE — PROGRESS NOTES
"INPATIENT PSYCHIATRIC PROGRESS NOTE    Name:  Indra Rodriguez  :  1975  MRN:  [id number]  Visit Number:  [id number]  Length of stay:  2    SUBJECTIVE    CC/Focus of Exam: opioid use    INTERVAL HISTORY:  The patient reports he is feeling a bit anxious and the chronic pain he is trying to manage. Also having stomach cramps.   He will be going to rehab tomorrow.   Depression rating 7/10  Anxiety rating 7/10  Sleep:fair  Withdrawal sx: body aches, upset stomach, weakness, tremors, foggy headed  Cravin/10    Review of Systems   Constitutional: Positive for fatigue.   Musculoskeletal: Positive for arthralgias, back pain and myalgias.   Neurological: Positive for tremors and weakness.   Psychiatric/Behavioral: Positive for dysphoric mood. The patient is nervous/anxious.        OBJECTIVE    Temp:  [97.3 °F (36.3 °C)-97.9 °F (36.6 °C)] 97.7 °F (36.5 °C)  Heart Rate:  [] 110  Resp:  [18] 18  BP: ()/(52-85) 104/74    MENTAL STATUS EXAM:  Appearance:Casually dressed, good hygeine.   Cooperation:Cooperative  Psychomotor: No psychomotor agitation/retardation, No EPS, No motor tics  Speech-normal rate, amount.  Mood \"anxious\"   Affect-congruent, appropriate, stable  Thought Content-goal directed, no delusional material present  Thought process-linear, organized.  Suicidality: No SI  Homicidality: No HI  Perception: No AH/VH  Insight-fair   Judgement-fair    Lab Results (last 24 hours)     ** No results found for the last 24 hours. **             Imaging Results (Last 24 Hours)     ** No results found for the last 24 hours. **             ECG/EMG Results (most recent)     None           ALLERGIES: Patient has no known allergies.    Medication Review:   Scheduled Medications:  gabapentin, 300 mg, Oral, TID  lisinopril, 20 mg, Oral, Q24H   And  hydroCHLOROthiazide, 25 mg, Oral, Q24H         PRN Medications:  •  acetaminophen  •  aluminum-magnesium hydroxide-simethicone  •  benzonatate  •  benztropine **OR** " benztropine  •  [] cloNIDine **FOLLOWED BY** cloNIDine **FOLLOWED BY** [START ON 2023] cloNIDine **FOLLOWED BY** [START ON 2023] cloNIDine  •  cyclobenzaprine  •  dicyclomine  •  famotidine  •  hydrALAZINE  •  hydrOXYzine  •  ibuprofen  •  loperamide  •  magnesium hydroxide  •  ondansetron  •  sodium chloride  •  tiZANidine  •  traZODone   All medications reviewed.    ASSESSMENT & PLAN:      Opioid use disorder, severe, dependence (HCC)  -Continue clonidine detox  -Symptom control medication       HTN (hypertension)  -Lisinopril-HCTZ       Chronic pain  -Continue gabapentin       Elevated Creatinine  -Resolved       Anxiety disorder unspecified  -Continue duloxetine 30 mg daily    Special precautions: Special Precautions Level 4 (q30 min checks).    Behavioral Health Treatment Plan and Problem List: I have reviewed and approved the Behavioral Health Treatment Plan and Problem list.  The patient has had a chance to review and agrees with the treatment plan.     Clinician:  Katherin Sorto MD  01/15/23  15:11 EST

## 2023-01-16 VITALS
SYSTOLIC BLOOD PRESSURE: 129 MMHG | DIASTOLIC BLOOD PRESSURE: 82 MMHG | OXYGEN SATURATION: 97 % | HEIGHT: 70 IN | BODY MASS INDEX: 36.79 KG/M2 | WEIGHT: 257 LBS | TEMPERATURE: 97.1 F | HEART RATE: 99 BPM | RESPIRATION RATE: 18 BRPM

## 2023-01-16 PROCEDURE — 99238 HOSP IP/OBS DSCHRG MGMT 30/<: CPT | Performed by: PSYCHIATRY & NEUROLOGY

## 2023-01-16 RX ORDER — LISINOPRIL AND HYDROCHLOROTHIAZIDE 25; 20 MG/1; MG/1
1 TABLET ORAL DAILY
Qty: 30 TABLET | Refills: 0 | Status: SHIPPED | OUTPATIENT
Start: 2023-01-16

## 2023-01-16 RX ADMIN — HYDROCHLOROTHIAZIDE 25 MG: 25 TABLET ORAL at 08:03

## 2023-01-16 RX ADMIN — HYDROXYZINE HYDROCHLORIDE 50 MG: 25 TABLET ORAL at 03:01

## 2023-01-16 RX ADMIN — GABAPENTIN 300 MG: 300 CAPSULE ORAL at 08:05

## 2023-01-16 RX ADMIN — IBUPROFEN 400 MG: 400 TABLET, FILM COATED ORAL at 03:01

## 2023-01-16 RX ADMIN — CYCLOBENZAPRINE 10 MG: 10 TABLET, FILM COATED ORAL at 08:06

## 2023-01-16 RX ADMIN — DICYCLOMINE HYDROCHLORIDE 10 MG: 10 CAPSULE ORAL at 08:06

## 2023-01-16 RX ADMIN — LISINOPRIL 20 MG: 10 TABLET ORAL at 08:03

## 2023-01-16 NOTE — PROGRESS NOTES
Discharge Planning Assessment   Last     Patient Name: Indra Rodriguez  MRN: [id number]  Today's Date: 1/16/2023    Admit Date: 1/13/2023    Patient is being discharged to [facility] today. This therapist poke with Patient who states that he is still agreeable to this plan. Patient states that his family will be picking him up and providing transportation there for him today. No other needs identified.     JITENDRA Buckley

## 2023-01-16 NOTE — PLAN OF CARE
Problem: Adult Behavioral Health Plan of Care  Goal: Plan of Care Review  Outcome: Adequate for Care Transition  Flowsheets (Taken 1/16/2023 1350)  Progress: improving  Plan of Care Reviewed With: patient  Patient Agreement with Plan of Care: agrees  Outcome Evaluation: Pt being discharged today.   Goal Outcome Evaluation:  Plan of Care Reviewed With: patient  Patient Agreement with Plan of Care: agrees     Progress: improving  Outcome Evaluation: Pt being discharged today.

## 2023-01-16 NOTE — DISCHARGE SUMMARY
":  1975  MRN:  [id number]  Visit Number:  [id number]      Date of Admission:2023   Date of Discharge:  2023    Discharge Diagnosis:  Principal Problem:    Opioid use disorder, severe, dependence (HCC)  Active Problems:    HTN (hypertension)        Admission Diagnosis:  Substance abuse (HCC) [F19.10]     KIARA Rodriguez is a 47 y.o. male who was admitted on 2023 with complaints of opioid use and withdrawals.   For details please see H&P dated 23.    Hospital Course  Patient is a 47 y.o. male presented with opioid use and withdrawals. The patient was admitted to the [facility] detox recovery unit for safety, further evaluation and treatment.  The patient was started on clonidine detox and symptom control medications including bentyl and flexeril. He was continued on lisinopril and hctz for hypertension and lower dose of gabapentin. Gabapentin was discontinued at discharge as patient was going to rehab.  The patient was also able to take part in individual and group counseling sessions and work on appropriate coping skills.  The patient made steady improvement in his withdrawals and mood and expressed feeling more positive and hopeful about future. Sleep and appetite were improved.  The day of discharge the patient was calm, cooperative and pleasant. Mood was reported to be good, and denied SI/HI/AVH. Also reported no medication side effects.  .      Mental Status Exam upon discharge:   Mood \"good\"   Affect-congruent, appropriate, stable  Thought Content-goal directed, no delusional material present  Thought process-linear, organized.  Suicidality: No SI  Homicidality: No HI  Perception: No /    Procedures Performed         Consults:   Consults     No orders found from 12/15/2022 to 2023.          Pertinent Test Results:   Admission on 2023   Component Date Value Ref Range Status   • Glucose 2023 136 (H)  65 - 99 mg/dL Final   • BUN 2023 14  6 - 20 mg/dL " Final   • Creatinine 01/14/2023 0.86  0.76 - 1.27 mg/dL Final   • Sodium 01/14/2023 139  136 - 145 mmol/L Final   • Potassium 01/14/2023 4.1  3.5 - 5.2 mmol/L Final    Slight hemolysis detected by analyzer. Results may be affected.   • Chloride 01/14/2023 103  98 - 107 mmol/L Final   • CO2 01/14/2023 29.7 (H)  22.0 - 29.0 mmol/L Final   • Calcium 01/14/2023 9.4  8.6 - 10.5 mg/dL Final   • BUN/Creatinine Ratio 01/14/2023 16.3  7.0 - 25.0 Final   • Anion Gap 01/14/2023 6.3  5.0 - 15.0 mmol/L Final   • eGFR 01/14/2023 108.8  >60.0 mL/min/1.73 Final    National Kidney Foundation and American Society of Nephrology (ASN) Task Force recommended calculation based on the Chronic Kidney Disease Epidemiology Collaboration (CKD-EPI) equation refit without adjustment for race.   Admission on 01/12/2023, Discharged on 01/13/2023   Component Date Value Ref Range Status   • Glucose 01/12/2023 98  65 - 99 mg/dL Final   • BUN 01/12/2023 14  6 - 20 mg/dL Final   • Creatinine 01/12/2023 1.30 (H)  0.76 - 1.27 mg/dL Final   • Sodium 01/12/2023 139  136 - 145 mmol/L Final   • Potassium 01/12/2023 3.8  3.5 - 5.2 mmol/L Final   • Chloride 01/12/2023 100  98 - 107 mmol/L Final   • CO2 01/12/2023 26.7  22.0 - 29.0 mmol/L Final   • Calcium 01/12/2023 9.4  8.6 - 10.5 mg/dL Final   • Total Protein 01/12/2023 7.1  6.0 - 8.5 g/dL Final   • Albumin 01/12/2023 3.9  3.5 - 5.2 g/dL Final   • ALT (SGPT) 01/12/2023 35  1 - 41 U/L Final   • AST (SGOT) 01/12/2023 16  1 - 40 U/L Final   • Alkaline Phosphatase 01/12/2023 90  39 - 117 U/L Final   • Total Bilirubin 01/12/2023 0.4  0.0 - 1.2 mg/dL Final   • Globulin 01/12/2023 3.2  gm/dL Final   • A/G Ratio 01/12/2023 1.2  g/dL Final   • BUN/Creatinine Ratio 01/12/2023 10.8  7.0 - 25.0 Final   • Anion Gap 01/12/2023 12.3  5.0 - 15.0 mmol/L Final   • eGFR 01/12/2023 69.0  >60.0 mL/min/1.73 Final    National Kidney Foundation and American Society of Nephrology (ASN) Task Force recommended calculation based on  the Chronic Kidney Disease Epidemiology Collaboration (CKD-EPI) equation refit without adjustment for race.   • Color, UA 01/12/2023 Dark Yellow (A)  Yellow, Straw Final   • Appearance, UA 01/12/2023 Clear  Clear Final   • pH, UA 01/12/2023 5.5  5.0 - 8.0 Final   • Specific Gravity, UA 01/12/2023 >1.030 (H)  1.005 - 1.030 Final   • Glucose, UA 01/12/2023 Negative  Negative Final   • Ketones, UA 01/12/2023 15 mg/dL (1+) (A)  Negative Final   • Bilirubin, UA 01/12/2023 Small (1+) (A)  Negative Final   • Blood, UA 01/12/2023 Negative  Negative Final   • Protein, UA 01/12/2023 100 mg/dL (2+) (A)  Negative Final   • Leuk Esterase, UA 01/12/2023 Trace (A)  Negative Final   • Nitrite, UA 01/12/2023 Negative  Negative Final   • Urobilinogen, UA 01/12/2023 1.0 E.U./dL  0.2 - 1.0 E.U./dL Final   • THC, Screen, Urine 01/12/2023 Positive (A)  Negative Final   • Phencyclidine (PCP), Urine 01/12/2023 Negative  Negative Final   • Cocaine Screen, Urine 01/12/2023 Negative  Negative Final   • Methamphetamine, Ur 01/12/2023 Negative  Negative Final   • Opiate Screen 01/12/2023 Negative  Negative Final   • Amphetamine Screen, Urine 01/12/2023 Negative  Negative Final   • Benzodiazepine Screen, Urine 01/12/2023 Positive (A)  Negative Final   • Tricyclic Antidepressants Screen 01/12/2023 Negative  Negative Final   • Methadone Screen, Urine 01/12/2023 Negative  Negative Final   • Barbiturates Screen, Urine 01/12/2023 Negative  Negative Final   • Oxycodone Screen, Urine 01/12/2023 Positive (A)  Negative Final   • Propoxyphene Screen 01/12/2023 Negative  Negative Final   • Buprenorphine, Screen, Urine 01/12/2023 Negative  Negative Final   • Magnesium 01/12/2023 1.8  1.6 - 2.6 mg/dL Final   • Ethanol 01/12/2023 <10  0 - 10 mg/dL Final   • Ethanol % 01/12/2023 <0.010  % Final   • WBC 01/12/2023 8.81  3.40 - 10.80 10*3/mm3 Final   • RBC 01/12/2023 4.95  4.14 - 5.80 10*6/mm3 Final   • Hemoglobin 01/12/2023 14.6  13.0 - 17.7 g/dL Final   •  Hematocrit 01/12/2023 43.9  37.5 - 51.0 % Final   • MCV 01/12/2023 88.7  79.0 - 97.0 fL Final   • MCH 01/12/2023 29.5  26.6 - 33.0 pg Final   • MCHC 01/12/2023 33.3  31.5 - 35.7 g/dL Final   • RDW 01/12/2023 12.9  12.3 - 15.4 % Final   • RDW-SD 01/12/2023 41.8  37.0 - 54.0 fl Final   • MPV 01/12/2023 8.7  6.0 - 12.0 fL Final   • Platelets 01/12/2023 275  140 - 450 10*3/mm3 Final   • Neutrophil % 01/12/2023 49.0  42.7 - 76.0 % Final   • Lymphocyte % 01/12/2023 35.1  19.6 - 45.3 % Final   • Monocyte % 01/12/2023 11.8  5.0 - 12.0 % Final   • Eosinophil % 01/12/2023 3.3  0.3 - 6.2 % Final   • Basophil % 01/12/2023 0.7  0.0 - 1.5 % Final   • Immature Grans % 01/12/2023 0.1  0.0 - 0.5 % Final   • Neutrophils, Absolute 01/12/2023 4.32  1.70 - 7.00 10*3/mm3 Final   • Lymphocytes, Absolute 01/12/2023 3.09  0.70 - 3.10 10*3/mm3 Final   • Monocytes, Absolute 01/12/2023 1.04 (H)  0.10 - 0.90 10*3/mm3 Final   • Eosinophils, Absolute 01/12/2023 0.29  0.00 - 0.40 10*3/mm3 Final   • Basophils, Absolute 01/12/2023 0.06  0.00 - 0.20 10*3/mm3 Final   • Immature Grans, Absolute 01/12/2023 0.01  0.00 - 0.05 10*3/mm3 Final   • nRBC 01/12/2023 0.0  0.0 - 0.2 /100 WBC Final   • COVID19 01/12/2023 Not Detected  Not Detected - Ref. Range Final   • Influenza A PCR 01/12/2023 Not Detected  Not Detected Final   • Influenza B PCR 01/12/2023 Not Detected  Not Detected Final   • RBC, UA 01/12/2023 0-2  None Seen, 0-2 /HPF Final   • WBC, UA 01/12/2023 3-5 (A)  None Seen, 0-2 /HPF Final   • Bacteria, UA 01/12/2023 Trace (A)  None Seen /HPF Final   • Squamous Epithelial Cells, UA 01/12/2023 0-2  None Seen, 0-2 /HPF Final   • Hyaline Casts, UA 01/12/2023 21-30  None Seen /LPF Final   • Methodology 01/12/2023 Manual Light Microscopy   Final   • QT Interval 01/13/2023 392  ms Final   • QTC Interval 01/13/2023 410  ms Final        Condition on Discharge:  improved    Vital Signs  Temp:  [97 °F (36.1 °C)-97.7 °F (36.5 °C)] 97.1 °F (36.2 °C)  Heart Rate:   [] 99  Resp:  [16-18] 18  BP: ()/(60-88) 129/82      Discharge Disposition:  Home or Self Care    Discharge Medications:     Discharge Medications      Continue These Medications      Instructions Start Date   lisinopril-hydrochlorothiazide 20-25 MG per tablet  Commonly known as: PRINZIDE,ZESTORETIC   1 tablet, Oral, Daily      tiZANidine 4 MG tablet  Commonly known as: ZANAFLEX   4 mg, Oral, Every 8 Hours PRN         Stop These Medications    gabapentin 400 MG capsule  Commonly known as: NEURONTIN     oxyCODONE 10 MG tablet  Commonly known as: ROXICODONE     oxyMORphone ER 10 MG 12 hr tablet  Commonly known as: OPANA ER            Discharge Diet: Regular     Activity at Discharge: As tolerated     Follow-up Appointments      [place] [id number]  [phone]         Time spent in discharge: < 30 min    Clinician:   Katherin Sorto MD  01/16/23  10:09 EST

## 2023-01-20 NOTE — ED PROVIDER NOTES
Subjective   History of Present Illness  Patient is a 47 year old male with past medical history significant for hypertension, asthma, obstructive sleep apnea, substance abuse, anxiety and depression. He presents to the ED requesting detox. He would like to detox from oxymorphone & oxycodone. Pt advised he takes Oxycodone 10mg tid with last dose 1-11-23, Oxymorphone 10mg bid with last dose 1-11-23. He reports he occasionally uses THC and xanax. He plans to [facility] in [place] following discharge. No SI/HI or AVH.         Review of Systems   Constitutional: Negative.  Negative for fever.   HENT: Negative.    Respiratory: Negative.    Cardiovascular: Negative.  Negative for chest pain.   Gastrointestinal: Negative.  Negative for abdominal pain.   Endocrine: Negative.    Genitourinary: Negative.  Negative for dysuria.   Skin: Negative.    Neurological: Negative.    Psychiatric/Behavioral: Positive for sleep disturbance. Negative for suicidal ideas. The patient is nervous/anxious.    All other systems reviewed and are negative.      Past Medical History:   Diagnosis Date   • Anxiety    • Asthma    • Depression    • HTN (hypertension)    • Kidney stones    • Neck injuries    • YENI (obstructive sleep apnea)    • Substance abuse (HCC)        No Known Allergies    Past Surgical History:   Procedure Laterality Date   • CERVICAL SPINE SURGERY     • CHOLECYSTECTOMY     • CYSTOSCOPY W/ LASER LITHOTRIPSY     • KNEE ARTHROSCOPY Right        Family History   Problem Relation Age of Onset   • Anxiety disorder Mother        Social History     Socioeconomic History   • Marital status:      Spouse name: Jana Rodriguez   • Number of children: 3   • Years of education: college   • Highest education level: Associate degree: academic program   Tobacco Use   • Smoking status: Never   Vaping Use   • Vaping Use: Never used   Substance and Sexual Activity   • Alcohol use: Never   • Drug use: Yes     Types: Benzodiazepines,  Hydromorphone, Oxycodone, Marijuana   • Sexual activity: Yes     Partners: Female           Objective   Physical Exam  Vitals and nursing note reviewed.   Constitutional:       General: He is not in acute distress.     Appearance: He is well-developed. He is not diaphoretic.   HENT:      Head: Normocephalic and atraumatic.      Right Ear: External ear normal.      Left Ear: External ear normal.      Nose: Nose normal.   Eyes:      Conjunctiva/sclera: Conjunctivae normal.      Pupils: Pupils are equal, round, and reactive to light.   Neck:      Vascular: No JVD.      Trachea: No tracheal deviation.   Cardiovascular:      Rate and Rhythm: Normal rate and regular rhythm.      Heart sounds: Normal heart sounds. No murmur heard.  Pulmonary:      Effort: Pulmonary effort is normal. No respiratory distress.      Breath sounds: Normal breath sounds. No wheezing.   Abdominal:      General: Bowel sounds are normal.      Palpations: Abdomen is soft.      Tenderness: There is no abdominal tenderness.   Musculoskeletal:         General: No deformity. Normal range of motion.      Cervical back: Normal range of motion and neck supple.   Skin:     General: Skin is warm and dry.      Capillary Refill: Capillary refill takes less than 2 seconds.      Coloration: Skin is not pale.      Findings: No erythema or rash.   Neurological:      Mental Status: He is alert and oriented to person, place, and time.      Cranial Nerves: No cranial nerve deficit.   Psychiatric:         Behavior: Behavior normal.         Thought Content: Thought content normal.         Procedures       Results for orders placed or performed during the hospital encounter of 01/12/23   COVID-19 and FLU A/B PCR - Swab, Nasopharynx    Specimen: Nasopharynx; Swab   Result Value Ref Range    COVID19 Not Detected Not Detected - Ref. Range    Influenza A PCR Not Detected Not Detected    Influenza B PCR Not Detected Not Detected   Comprehensive Metabolic Panel    Specimen:  Blood   Result Value Ref Range    Glucose 98 65 - 99 mg/dL    BUN 14 6 - 20 mg/dL    Creatinine 1.30 (H) 0.76 - 1.27 mg/dL    Sodium 139 136 - 145 mmol/L    Potassium 3.8 3.5 - 5.2 mmol/L    Chloride 100 98 - 107 mmol/L    CO2 26.7 22.0 - 29.0 mmol/L    Calcium 9.4 8.6 - 10.5 mg/dL    Total Protein 7.1 6.0 - 8.5 g/dL    Albumin 3.9 3.5 - 5.2 g/dL    ALT (SGPT) 35 1 - 41 U/L    AST (SGOT) 16 1 - 40 U/L    Alkaline Phosphatase 90 39 - 117 U/L    Total Bilirubin 0.4 0.0 - 1.2 mg/dL    Globulin 3.2 gm/dL    A/G Ratio 1.2 g/dL    BUN/Creatinine Ratio 10.8 7.0 - 25.0    Anion Gap 12.3 5.0 - 15.0 mmol/L    eGFR 69.0 >60.0 mL/min/1.73   Urinalysis With Microscopic If Indicated (No Culture) - Urine, Clean Catch    Specimen: Urine, Clean Catch   Result Value Ref Range    Color, UA Dark Yellow (A) Yellow, Straw    Appearance, UA Clear Clear    pH, UA 5.5 5.0 - 8.0    Specific Gravity, UA >1.030 (H) 1.005 - 1.030    Glucose, UA Negative Negative    Ketones, UA 15 mg/dL (1+) (A) Negative    Bilirubin, UA Small (1+) (A) Negative    Blood, UA Negative Negative    Protein,  mg/dL (2+) (A) Negative    Leuk Esterase, UA Trace (A) Negative    Nitrite, UA Negative Negative    Urobilinogen, UA 1.0 E.U./dL 0.2 - 1.0 E.U./dL   Urine Drug Screen - Urine, Clean Catch    Specimen: Urine, Clean Catch   Result Value Ref Range    THC, Screen, Urine Positive (A) Negative    Phencyclidine (PCP), Urine Negative Negative    Cocaine Screen, Urine Negative Negative    Methamphetamine, Ur Negative Negative    Opiate Screen Negative Negative    Amphetamine Screen, Urine Negative Negative    Benzodiazepine Screen, Urine Positive (A) Negative    Tricyclic Antidepressants Screen Negative Negative    Methadone Screen, Urine Negative Negative    Barbiturates Screen, Urine Negative Negative    Oxycodone Screen, Urine Positive (A) Negative    Propoxyphene Screen Negative Negative    Buprenorphine, Screen, Urine Negative Negative   Magnesium    Specimen:  Blood   Result Value Ref Range    Magnesium 1.8 1.6 - 2.6 mg/dL   Ethanol    Specimen: Blood   Result Value Ref Range    Ethanol <10 0 - 10 mg/dL    Ethanol % <0.010 %   CBC Auto Differential    Specimen: Blood   Result Value Ref Range    WBC 8.81 3.40 - 10.80 10*3/mm3    RBC 4.95 4.14 - 5.80 10*6/mm3    Hemoglobin 14.6 13.0 - 17.7 g/dL    Hematocrit 43.9 37.5 - 51.0 %    MCV 88.7 79.0 - 97.0 fL    MCH 29.5 26.6 - 33.0 pg    MCHC 33.3 31.5 - 35.7 g/dL    RDW 12.9 12.3 - 15.4 %    RDW-SD 41.8 37.0 - 54.0 fl    MPV 8.7 6.0 - 12.0 fL    Platelets 275 140 - 450 10*3/mm3    Neutrophil % 49.0 42.7 - 76.0 %    Lymphocyte % 35.1 19.6 - 45.3 %    Monocyte % 11.8 5.0 - 12.0 %    Eosinophil % 3.3 0.3 - 6.2 %    Basophil % 0.7 0.0 - 1.5 %    Immature Grans % 0.1 0.0 - 0.5 %    Neutrophils, Absolute 4.32 1.70 - 7.00 10*3/mm3    Lymphocytes, Absolute 3.09 0.70 - 3.10 10*3/mm3    Monocytes, Absolute 1.04 (H) 0.10 - 0.90 10*3/mm3    Eosinophils, Absolute 0.29 0.00 - 0.40 10*3/mm3    Basophils, Absolute 0.06 0.00 - 0.20 10*3/mm3    Immature Grans, Absolute 0.01 0.00 - 0.05 10*3/mm3    nRBC 0.0 0.0 - 0.2 /100 WBC   Urinalysis, Microscopic Only - Urine, Clean Catch    Specimen: Urine, Clean Catch   Result Value Ref Range    RBC, UA 0-2 None Seen, 0-2 /HPF    WBC, UA 3-5 (A) None Seen, 0-2 /HPF    Bacteria, UA Trace (A) None Seen /HPF    Squamous Epithelial Cells, UA 0-2 None Seen, 0-2 /HPF    Hyaline Casts, UA 21-30 None Seen /LPF    Methodology Manual Light Microscopy    ECG 12 Lead Other; psych admission   Result Value Ref Range    QT Interval 392 ms    QTC Interval 410 ms       ED Course                                           Medical Decision Making  Patient is a 47 year old male with past medical history significant for hypertension, asthma, obstructive sleep apnea, substance abuse, anxiety and depression. He presents to the ED requesting detox. He would like to detox from oxymorphone & oxycodone. Pt advised he takes Oxycodone  10mg tid with last dose 1-11-23, Oxymorphone 10mg bid with last dose 1-11-23. He reports he occasionally uses THC and xanax. He plans to [facility] in [place] following discharge. No SI/HI or AVH.     Substance abuse (HCC):     Details: Admit to detox  Amount and/or Complexity of Data Reviewed  Labs: ordered.  ECG/medicine tests: ordered.      Risk  Prescription drug management.          Final diagnoses:   Substance abuse (HCC)       ED Disposition  ED Disposition     ED Disposition   DC/Transfer to Behavioral Health Condition   --    Comment   --             No follow-up provider specified.       Medication List      No changes were made to your prescriptions during this visit.          Bianca Fitzgerald, APRN  01/20/23 1530